# Patient Record
Sex: FEMALE | Race: WHITE | URBAN - METROPOLITAN AREA
[De-identification: names, ages, dates, MRNs, and addresses within clinical notes are randomized per-mention and may not be internally consistent; named-entity substitution may affect disease eponyms.]

---

## 2018-02-22 ENCOUNTER — OFFICE VISIT (OUTPATIENT)
Dept: CARDIOLOGY CLINIC | Age: 73
End: 2018-02-22

## 2018-02-22 VITALS
BODY MASS INDEX: 27.09 KG/M2 | HEART RATE: 88 BPM | DIASTOLIC BLOOD PRESSURE: 70 MMHG | SYSTOLIC BLOOD PRESSURE: 98 MMHG | OXYGEN SATURATION: 94 % | WEIGHT: 138 LBS | HEIGHT: 60 IN

## 2018-02-22 DIAGNOSIS — I50.9 CHRONIC CONGESTIVE HEART FAILURE, UNSPECIFIED CONGESTIVE HEART FAILURE TYPE: ICD-10-CM

## 2018-02-22 DIAGNOSIS — I47.29 NSVT (NONSUSTAINED VENTRICULAR TACHYCARDIA): Primary | ICD-10-CM

## 2018-02-22 DIAGNOSIS — I48.0 PAROXYSMAL ATRIAL FIBRILLATION (HCC): ICD-10-CM

## 2018-02-22 PROCEDURE — G8400 PT W/DXA NO RESULTS DOC: HCPCS | Performed by: INTERNAL MEDICINE

## 2018-02-22 PROCEDURE — 4040F PNEUMOC VAC/ADMIN/RCVD: CPT | Performed by: INTERNAL MEDICINE

## 2018-02-22 PROCEDURE — 1036F TOBACCO NON-USER: CPT | Performed by: INTERNAL MEDICINE

## 2018-02-22 PROCEDURE — G8427 DOCREV CUR MEDS BY ELIG CLIN: HCPCS | Performed by: INTERNAL MEDICINE

## 2018-02-22 PROCEDURE — 99214 OFFICE O/P EST MOD 30 MIN: CPT | Performed by: INTERNAL MEDICINE

## 2018-02-22 PROCEDURE — 3014F SCREEN MAMMO DOC REV: CPT | Performed by: INTERNAL MEDICINE

## 2018-02-22 PROCEDURE — 3017F COLORECTAL CA SCREEN DOC REV: CPT | Performed by: INTERNAL MEDICINE

## 2018-02-22 PROCEDURE — 1090F PRES/ABSN URINE INCON ASSESS: CPT | Performed by: INTERNAL MEDICINE

## 2018-02-22 PROCEDURE — G8484 FLU IMMUNIZE NO ADMIN: HCPCS | Performed by: INTERNAL MEDICINE

## 2018-02-22 PROCEDURE — 1123F ACP DISCUSS/DSCN MKR DOCD: CPT | Performed by: INTERNAL MEDICINE

## 2018-02-22 PROCEDURE — G8419 CALC BMI OUT NRM PARAM NOF/U: HCPCS | Performed by: INTERNAL MEDICINE

## 2018-02-22 RX ORDER — FENTANYL 37.5 UG/H
PATCH, EXTENDED RELEASE TRANSDERMAL
COMMUNITY

## 2018-02-22 RX ORDER — DOCUSATE SODIUM 100 MG/1
100 CAPSULE, LIQUID FILLED ORAL 2 TIMES DAILY
COMMUNITY

## 2018-02-22 RX ORDER — ALPRAZOLAM 0.5 MG/1
0.5 TABLET ORAL NIGHTLY PRN
COMMUNITY

## 2018-02-22 RX ORDER — DULOXETIN HYDROCHLORIDE 60 MG/1
60 CAPSULE, DELAYED RELEASE ORAL DAILY
COMMUNITY

## 2018-02-22 RX ORDER — AZITHROMYCIN 250 MG/1
250 TABLET, FILM COATED ORAL DAILY
COMMUNITY

## 2018-02-22 RX ORDER — FUROSEMIDE 20 MG/1
20 TABLET ORAL DAILY
COMMUNITY

## 2018-02-22 RX ORDER — LEVOTHYROXINE SODIUM 0.07 MG/1
75 TABLET ORAL DAILY
COMMUNITY

## 2018-02-22 RX ORDER — DULOXETIN HYDROCHLORIDE 30 MG/1
30 CAPSULE, DELAYED RELEASE ORAL DAILY
COMMUNITY

## 2018-02-22 RX ORDER — BENZONATATE 100 MG/1
200 CAPSULE ORAL 3 TIMES DAILY PRN
COMMUNITY

## 2018-02-22 RX ORDER — DICYCLOMINE HYDROCHLORIDE 10 MG/1
10 CAPSULE ORAL EVERY 6 HOURS PRN
COMMUNITY

## 2018-02-22 RX ORDER — M-VIT,TX,IRON,MINS/CALC/FOLIC 27MG-0.4MG
1 TABLET ORAL DAILY
COMMUNITY

## 2018-02-22 RX ORDER — SERTRALINE HYDROCHLORIDE 100 MG/1
100 TABLET, FILM COATED ORAL DAILY
COMMUNITY

## 2018-02-22 ASSESSMENT — ENCOUNTER SYMPTOMS
ABDOMINAL PAIN: 0
WHEEZING: 0
COLOR CHANGE: 0
SHORTNESS OF BREATH: 1
COUGH: 0
CHEST TIGHTNESS: 0
BLOOD IN STOOL: 0
EYE PAIN: 0
EYE REDNESS: 0

## 2018-02-22 NOTE — PROGRESS NOTES
Subjective:      Patient ID: Arben Mendoza is a 67 y.o. female. Reason for visit: atrial fib with RVR  CC: \"I have pain across my chest\"    HPI Arben Mendoza is here at the request of the F for evaluation of atrial fib with RVR. She was hospitalized 1/2018 for acute on chronic respiratory failure, atrial flutter with RVR and troponin elevation likely secondary to tachycardia and respiratory failure. She was also treated for diastolic HF. Today she denies exertional chest pain, palpitations, dizziness, syncope, leg swelling and cough. She reports pain across her anterior chest when she pushes on her chest. Pt is a poor historian due to dementia. Hx obtained from previous records. She is using a wheelchair and 02. Review of Systems   Constitutional: Negative for activity change, appetite change, diaphoresis and fatigue. HENT: Negative for nosebleeds and tinnitus. Eyes: Negative for pain and redness. Respiratory: Positive for shortness of breath. Negative for cough, chest tightness and wheezing. Cardiovascular: Positive for chest pain. Negative for palpitations and leg swelling. Gastrointestinal: Negative for abdominal pain and blood in stool. Genitourinary: Negative for difficulty urinating, hematuria, pelvic pain and vaginal bleeding. Musculoskeletal: Negative for joint swelling, myalgias and neck pain. Skin: Negative for color change and pallor. Neurological: Negative for dizziness, syncope, numbness and headaches. Hematological: Does not bruise/bleed easily. Psychiatric/Behavioral: Negative for confusion and decreased concentration. All other systems reviewed and are negative. Objective:   Physical Exam   Constitutional: She is oriented to person, place, and time. She appears well-developed and well-nourished. HENT:   Head: Normocephalic and atraumatic. Eyes: Conjunctivae are normal. Right eye exhibits no discharge. Left eye exhibits no discharge.    Neck: Normal Encounters:   02/22/18 138 lb (62.6 kg)   07/18/15 111 lb (50.3 kg)   05/15/15 134 lb (60.8 kg)     BP Readings from Last 3 Encounters:   02/22/18 98/70   07/18/15 124/60   05/25/15 114/51        Recent labs and imaging reviewed. Assessment:       Anemia     Hg 10.7 1/2018. Managed by PCP.  Dementia     Atrial fibrillation (HCC)     CHADS VASC 2. Taking Eliquis. TSH, Mg and K levels normal 1/2018. Rate control strategy.  CHF (congestive heart failure) (HCC)     Diastolic. proBNP 6813 2/4304. Echo 1/2018 LVEF 55-60%, grade I DD, mild TR.  Chronic respiratory failure with hypoxia / COPD- managed by PCP     Troponin elevation secondary to tachycardia and respiratory failure. HLD- not taking statin    Hepatitis C- hx     NSVT while hospitalized for acute respiratory failure    Hypothyroidism. Managed by PCP. GERD- hx     Chest pain atypical.                  Plan:      Clinically stable from cardiac standpoint. dHF stable. Atrial fib controlled. No angina. Atypical chest pain. Will arrange 48 hour HM to evaluate rate control. Continue Eliquis. Not candidate for stress test or angiogram. Reluctant to add BB due to COPD.

## 2018-02-22 NOTE — LETTER
[address] [facility] - [address] [facility] [address]  [address]   [address] [address]  Phone: [phone]  Fax: [phone]    Maximo Wolfe MD        March 7, 2018     [place], [place] [address] [facility]    Patient: Claudia Ashford  MR Number: [id number]  YOB: 1945  Date of Visit: 2/22/2018    Dear Dr. Saintclair Hollow:    HPI Claudia Ashford is here at the request of the [facility] for evaluation of atrial fib with RVR. She was hospitalized 1/2018 for acute on chronic respiratory failure, atrial flutter with RVR and troponin elevation likely secondary to tachycardia and respiratory failure. She was also treated for diastolic HF. Today she denies exertional chest pain, palpitations, dizziness, syncope, leg swelling and cough. She reports pain across her anterior chest when she pushes on her chest. Pt is a poor historian due to dementia. Hx obtained from previous records. She is using a wheelchair and 02. Assessment:       Anemia     Hg 10.7 1/2018. Managed by PCP.  Dementia     Atrial fibrillation (HCC)     CHADS VASC 2. Taking Eliquis. TSH, Mg and K levels normal 1/2018. Rate control strategy.  CHF (congestive heart failure) (HCC)     Diastolic. proBNP 8371 7/3697. Echo 1/2018 LVEF 55-60%, grade I DD, mild TR.  Chronic respiratory failure with hypoxia / COPD- managed by PCP     Troponin elevation secondary to tachycardia and respiratory failure. HLD- not taking statin    Hepatitis C- hx     NSVT while hospitalized for acute respiratory failure    Hypothyroidism. Managed by PCP. GERD- hx     Chest pain atypical.                  Plan:      Clinically stable from cardiac standpoint. dHF stable. Atrial fib controlled. No angina. Atypical chest pain. Will arrange 48 hour HM to evaluate rate control. Continue Eliquis. Not candidate for stress test or angiogram. Reluctant to add BB due to COPD. If you have questions, please do not hesitate to call me. I look forward to following Crissie Hodgkins along with you.     Sincerely,        Tia Monique MD